# Patient Record
Sex: FEMALE | Race: WHITE | NOT HISPANIC OR LATINO | Employment: FULL TIME | URBAN - METROPOLITAN AREA
[De-identification: names, ages, dates, MRNs, and addresses within clinical notes are randomized per-mention and may not be internally consistent; named-entity substitution may affect disease eponyms.]

---

## 2022-07-13 ENCOUNTER — OFFICE VISIT (OUTPATIENT)
Dept: OBSTETRICS AND GYNECOLOGY | Facility: CLINIC | Age: 22
End: 2022-07-13

## 2022-07-13 VITALS
BODY MASS INDEX: 21.83 KG/M2 | HEIGHT: 65 IN | SYSTOLIC BLOOD PRESSURE: 116 MMHG | WEIGHT: 131 LBS | DIASTOLIC BLOOD PRESSURE: 70 MMHG

## 2022-07-13 DIAGNOSIS — Z30.46 NEXPLANON REMOVAL: Primary | ICD-10-CM

## 2022-07-13 DIAGNOSIS — Z30.46 ENCOUNTER FOR REMOVAL AND REINSERTION OF NEXPLANON: ICD-10-CM

## 2022-07-13 PROCEDURE — 11983 REMOVE/INSERT DRUG IMPLANT: CPT | Performed by: OBSTETRICS & GYNECOLOGY

## 2022-07-13 RX ORDER — ETONOGESTREL 68 MG/1
1 IMPLANT SUBCUTANEOUS ONCE
COMMUNITY
End: 2022-07-13

## 2022-07-13 NOTE — PROGRESS NOTES
Chief Complaint   Patient presents with   • Establish Care     Nexplanon removal and reinsertion           Subjective   HPI  Juana Marquez is a 21 y.o. female, , Patient's last menstrual period was 2022 (exact date). who presents for establish of care and nexplanon removal. She is currently using nexplanon for contraception. She had device placed 2019 in [place].     With her birth control her periods are rare, lasting 1-2 days. Dysmenorrhea:mild, occurring premenstrually and first 1-2 days of flow.  Partner Status: Marital Status: single.  The patient's current method of contraception is contraceptive methods: Nexplanon.  She is satisfied with her current method of birth control. She would like to discuss continuing with nexplanon. The patient reports additional symptoms as none.      She is not currently sexually active.     Additional OB/GYN History     OB History        0    Para   0    Term   0       0    AB   0    Living   0       SAB   0    IAB   0    Ectopic   0    Molar   0    Multiple   0    Live Births   0                          Procedure: Nexplanon Removal and Reinsertion     Procedures    Pre Dx: 1) Nexplanon removal and Reinsertion   Post Dx: 1) Nexplanon removal and Reinsertion     The risks, benefits, and alternatives to removal and reinsertion of the device have been discussed with the patient at length. All of her questions are answered. She is aware of the need for alternative means of contraception if desired. Verbal informed consent is obtained.    Able to easily palpate the device in the  Left arm. Additional imaging was not required. The device feels freely mobile and easily accessible. She was placed in the examining table in a supine position with her arm flexed at the elbow and hand under her head. The skin over this site is prepped with Betadine. 2-3 mL of 1% lidocaine with epinephrine was injected.    Downward pressure was applied on the proximal  end of the implant nearest the axilla, and a 2-3 mm incision was made in a longitudinal direction of the arm at the tip of the implant closest to the elbow. The implant was then pushed gently toward the incision until the tip was visible. The fibrous capsule was opened with blunt dissection using a hemostat. The implant was grasp with a hemostat and was easily removed intact. It measured a  full 4 cm in length.    Steristrip was placed over incision site as well as a pressure dressing.     Nexplanon Insertion:    NDC#: 90007-730-20  Lot #: [id number]  Exp date: 7/03/2024  BH device    Patient's LMP was 6/09/2022  She did not have a UPT in office today. She is not currently sexually active.     The new insertion site on the Left arm was identified approximately 8-10 cm proximal to the humoral epicondyle and 3-4 cm below with sulcus of the triceps and biceps muscle. The skin at the insertion site was stretched by my thumb and index finger. The insertion site was anesthetized about 3 mL of 1% lidocaine. Nex, the needle tip was inserted, bevel side up, at an angle not greater than 30° to the skin surface, just until the skin was penetrated to below the bevel. The applicator was then lowered so that it was parallel to the arm. To minimize the chance of deep incision, the skin was tented upwards with the tip of the needle. The needle was then gently inserted to the full length and the device was fixed in place. I then slowly and carefully retracted the needle back by retracting the release lever. The obturator was seen in the device to determine that the nexplanon had been released.     Both the patient and I were easily able to feel the device under the skin. Steri-Strips were applied at the site, and the arm was gently wrapped with gauze.    There were no complications.   The patient tolerated the procedure well.    She was given a reminder card. She was advised to use condoms as a backup method for at least a  "week after insertion.     She understands that the device terms in three years.     Expectations, warning signs and limitations reviewed.     Simon Jessica MD  07/13/2022      The additional following portions of the patient's history were reviewed and updated as appropriate: allergies, current medications, past family history, past medical history, past social history, past surgical history and problem list.    Review of Systems   Constitutional: Negative.    HENT: Negative.    Eyes: Negative.    Respiratory: Negative.    Cardiovascular: Negative.    Gastrointestinal: Negative.    Endocrine: Negative.    Genitourinary: Negative.    Musculoskeletal: Negative.    Skin: Negative.    Allergic/Immunologic: Negative.    Neurological: Negative.    Hematological: Negative.    Psychiatric/Behavioral: Negative.        I have reviewed and agree with the HPI, ROS, and historical information as entered above. Simon Jessica MD    Objective   /70   Ht 165.1 cm (65\")   Wt 59.4 kg (131 lb)   LMP 06/09/2022 (Exact Date)   BMI 21.80 kg/m²     Physical Exam  Vitals reviewed.   Constitutional:       Appearance: Normal appearance.   HENT:      Head: Normocephalic and atraumatic.   Skin:     General: Skin is warm and dry.   Neurological:      Mental Status: She is alert and oriented to person, place, and time.   Psychiatric:         Mood and Affect: Mood normal.         Behavior: Behavior normal.         Assessment & Plan     Assessment     Problem List Items Addressed This Visit        Genitourinary and Reproductive     Nexplanon removal - Primary      Other Visit Diagnoses     Encounter for removal and reinsertion of Nexplanon        Relevant Medications    Etonogestrel (NEXPLANON) 68 MG subdermal implant (Completed)          1. Nexplanon removed and new device placed today without complications.   2. F/u in 1 year for annual GYN exam and first pap smear.       Simon Jessica MD  07/13/2022  "